# Patient Record
(demographics unavailable — no encounter records)

---

## 2019-04-26 NOTE — RAD
Examination: VENOUS LOWER EXTREMITY RIGHT

 

History: Right leg swelling for the past 4 days

 

COMPARISON/CORRELATION:  None

 

FINDINGS: Right lower extremity duplex venous ultrasound exam was 

performed. Grayscale, color Doppler, and spectral Doppler imaging was 

performed. Compression and augmentation was performed.

 

The right common femoral vein, superficial  femoral vein, popliteal vein, 

and greater saphenous vein are normal with no evidence of deep venous 

thrombus. Visualized right calf veins are unremarkable. Left common 

femoral vein is unremarkable Normal compressibility and augmentation is 

evident.

 

 

IMPRESSION:

Normal right lower extremity duplex ultrasound exam.

 

No evidence of deep venous thrombus involving the right lower extremity.

 

Electronically signed by: Benoit Hanks MD (4/26/2019 1:50 PM) [facility]